# Patient Record
Sex: MALE | Race: BLACK OR AFRICAN AMERICAN
[De-identification: names, ages, dates, MRNs, and addresses within clinical notes are randomized per-mention and may not be internally consistent; named-entity substitution may affect disease eponyms.]

---

## 2019-03-01 ENCOUNTER — HOSPITAL ENCOUNTER (EMERGENCY)
Dept: HOSPITAL 65 - ER | Age: 54
LOS: 1 days | Discharge: OTHER INSTITUTION | End: 2019-03-02
Payer: MEDICARE

## 2019-03-01 VITALS
SYSTOLIC BLOOD PRESSURE: 117 MMHG | WEIGHT: 280 LBS | BODY MASS INDEX: 34.82 KG/M2 | DIASTOLIC BLOOD PRESSURE: 71 MMHG | HEIGHT: 75 IN

## 2019-03-01 DIAGNOSIS — R22.41: ICD-10-CM

## 2019-03-01 DIAGNOSIS — M86.9: Primary | ICD-10-CM

## 2019-03-01 DIAGNOSIS — E87.6: ICD-10-CM

## 2019-03-01 DIAGNOSIS — Z79.899: ICD-10-CM

## 2019-03-01 PROCEDURE — 81000 URINALYSIS NONAUTO W/SCOPE: CPT

## 2019-03-01 PROCEDURE — 80053 COMPREHEN METABOLIC PANEL: CPT

## 2019-03-01 PROCEDURE — 36415 COLL VENOUS BLD VENIPUNCTURE: CPT

## 2019-03-01 PROCEDURE — 93005 ELECTROCARDIOGRAM TRACING: CPT

## 2019-03-01 PROCEDURE — 82550 ASSAY OF CK (CPK): CPT

## 2019-03-01 PROCEDURE — 99285 EMERGENCY DEPT VISIT HI MDM: CPT

## 2019-03-01 PROCEDURE — 71045 X-RAY EXAM CHEST 1 VIEW: CPT

## 2019-03-01 PROCEDURE — 82803 BLOOD GASES ANY COMBINATION: CPT

## 2019-03-01 PROCEDURE — 96365 THER/PROPH/DIAG IV INF INIT: CPT

## 2019-03-01 PROCEDURE — 96375 TX/PRO/DX INJ NEW DRUG ADDON: CPT

## 2019-03-01 PROCEDURE — 36600 WITHDRAWAL OF ARTERIAL BLOOD: CPT

## 2019-03-01 PROCEDURE — 87040 BLOOD CULTURE FOR BACTERIA: CPT

## 2019-03-01 PROCEDURE — 85025 COMPLETE CBC W/AUTO DIFF WBC: CPT

## 2019-03-01 PROCEDURE — 83880 ASSAY OF NATRIURETIC PEPTIDE: CPT

## 2019-03-01 PROCEDURE — 73706 CT ANGIO LWR EXTR W/O&W/DYE: CPT

## 2019-03-01 PROCEDURE — 87086 URINE CULTURE/COLONY COUNT: CPT

## 2019-03-01 NOTE — PCM.EKG
[facility]

                                       

Test Date:    2019               Test Time:    23:52:10

Pat Name:     LEDA PATEL             Department:   

Patient ID:   [id number]          Room:          

Gender:       M                        Technician:   

:          1965               Requested By: KELSEY MARTINES

Order Number: [id number]           Reading MD:   Kelsey Martines

                                 Measurements

Intervals                              Axis          

Rate:         117                      P:            71

FL:           124                      QRS:          34

QRSD:         88                       T:            24

QT:           332                                    

QTc:          463                                    

                           Interpretive Statements

Sinus tachycardia

Nonspecific ST and T wave abnormality

Abnormal ECG

No previous ECG available for comparison



Electronically Signed On 3-2-2019 22:00:38 CST by Kelsey Martines



Please click the below link to view image of tracing.

## 2019-03-01 NOTE — ER.PDOC
General


Chief Complaint:  Requesting Medical Care


Stated Complaint:  Edema


Time seen by MD:  23:29


Source:  patient


Exam Limitations:  no limitations





History of Present Illness


Initial Comments


Pt's sister called EMS due to inability to get pt out of chair and into her 

car. Had appt tomorrow with a doctor in [place], but was unable to get him 

there. Pt can not bend R leg fully or bear weight on it. Leg has been swollen 

for more than 1 year, and has not sought treatment for it.


Onset:  other (more than 1 year ago)


Severity:  severe


Exacerbated By:  nothing


Relieved By:  nothing


Prior symptoms/Treatment:  No Similar symptoms previous, No Recenly Seen, No 

Treated by Doctor, No Recently Hospitalized


Allergies:  


Coded Allergies:  


     No Known Allergies (Unverified , 8/25/13)





Review of Systems


Constitutional:  no symptoms reported


EENTM:  no symptoms reported


Respiratory:  no symptoms reported


Cardiovascular:  no symptoms reported


Gastrointestinal:  no symptoms reported


Genitourinary:  no symptoms reported


Skin:  dryness


Psychiatric/Neurological:  other (decreased sensation to R lower leg (worse in 

the foot) for almost 1 year)


All Other Systems:  Reviewed and Negative





Physical Exam


General Appearance:  Alert


Lower Extremity:  tenderness (over fluctuant regions near R knee), swelling (

severe, entire R leg), pedal edema (3+ on R)


Vascular:  decreased pulses (difficu)


Neuro/Psych:  sensory deficit (decreased sensation R lower leg, from about mid 

lower leg to foot, worse on sole of foot (near absent))


Skin:  color nml


Back/Neck:  nml inspection


Respiratory:  no resp distress


CVS:  tachycardia (reg rhythm)


Abdomen:  non-tender


Comments


Pt has chronic skin changes/thickening of R lower leg up to around knee. Unable 

to straighten leg completely, kept flexed at approx 15-20 degrees.





Results/Orders


Results/Orders





Laboratory Tests








Test


 3/2/19


00:00 3/2/19


00:31


 


White Blood Count


 7.2 10^3/uL


(4.5-11.0) 





 


Red Blood Count


 4.08 10^6/uL


(4.50-5.90) 





 


Hemoglobin


 10.9 g/dL


(13.9-16.3) 





 


Hematocrit


 32.9 %


(37.0-53.0) 





 


Mean Corpuscular Volume


 80.6 fL


() 





 


Mean Corpuscular Hemoglobin


 26.7 pg


(26-34) 





 


Mean Corpuscular Hemoglobin


Concent 33.1 g/dL


(33-37) 





 


Red Cell Distribution Width


 17.7 %


(11.5-14.5) 





 


Platelet Count


 243 10^3/uL


(150-400) 





 


Mean Platelet Volume


 11.1 fL


(7.8-11.0) 





 


Neutrophils (%) (Auto)


 70.0 %


(41.0-85.0) 





 


Lymphocytes (%) (Auto)


 13.6 %


(24.0-44.0) 





 


Monocytes (%) (Auto)


 13.7 %


(5.0-12.0) 





 


Neutrophils # (Auto)


 5.0 10^3/uL


(1.8-7.7) 





 


Lymphocytes # (Auto)


 1.0 10^3/uL


(1.0-4.8) 





 


Monocytes # (Auto)


 1.0 10^3/uL


(0.3-0.8) 





 


Absolute Immature Granulocyte


(auto 0.01 10^3 u/L


(0-2) 





 


Eosinophils %


 2.2 %


(0.0-5.0) 





 


Basophils %


 0.4 %


(0.0-0.2) 





 


Basophils #


 0.0 10^3/uL


(0.0-0.1) 





 


Eosinophil Count


 0.2 10^3/uL


(0.0-0.2) 





 


Blood Gas Sample Site


 RT RADIAL


ARTERY 





 


Blood Gas pH


 7.379


(7.350-7.450) 





 


Blood Gas PCO2


 18.8 mmHg


(35.0-45.0) 





 


Blood Gas PO2


 103.9 mmHg


(75.0-100.0) 





 


Blood Gas HCO3


 10.8 mmol/L


(22.0-26.0) 





 


Blood Gas Base Excess


 -12.1 mmol/L


(-2.0-2.0) 





 


Audi Test POSITIVE  


 


Arterial Blood Oxygen


Saturation 96.4 % (95-) 


 





 


Deoxyhemoglobin


 3.5 %


(0.2-0.6) 





 


Carboxyhemoglobin


 2.4 %


(0.5-1.5) 





 


Methemoglobin


 0.2 %


(0.2-0.6) 





 


Total Hemoglobin


 11.5 %


(13.5-17.5) 





 


Total Oxygen Concentration


 15.3 %


(13.5-17.5) 





 


Lactic Acid (Blood Gas)


 11.4 MMOL/L


(0.5-1.0) 





 


Oxygen Delivery Method (LAB) RA  


 


FiO2 21 % ()  


 


Sodium Level


 140 mmol/L


(132-145) 





 


Potassium Level


 2.5 mmol/L


(3.6-5.2) 





 


Chloride Level


 97.0 mmol/L


() 





 


Carbon Dioxide Level


 10.6 mmol/L


(20.0-32) 





 


Bicarbonate


 11.4 mmol/L


(23-27) 





 


Anion Gap 34.9  


 


Blood Urea Nitrogen 5 mg/dL (7-18)  


 


Creatinine


 0.68 mg/dL


(0.59-1.40) 





 


Estimated GFR (


American) 147.0 (>/=60) 


 





 


BUN/Creatinine Ratio 7.0  


 


Glucose Level


 54 mg/dL


() 





 


Calcium Level


 8.4 mg/dL


(8.4-10.5) 





 


Total Bilirubin


 0.6 mg/dL


(0.2-1.0) 





 


Aspartate Amino Transf


(AST/SGOT) 70 U/L (0-35) 


 





 


Alanine Aminotransferase


(ALT/SGPT) 17 U/L (12-78) 


 





 


Alkaline Phosphatase


 62 U/L


() 





 


Total Creatine Kinase


 89 U/L


() 





 


Pro-B-Type Natriuretic Peptide


 17 pg/mL


(0-125) 





 


Total Protein


 6.6 g/dL


(6.4-8.2) 





 


Albumin


 2.5 g/dL


(3.4-5.0) 





 


Globulin 4.1  


 


Percent Immature Gran (Cell


Imm) 0.10 %


(0.00-0.50) 





 


Urine Collection Type  CCMS 


 


Urine Color


 


 YELLOW


(YELLOW)


 


Urine Appearance  CLEAR (CLEAR) 


 


Urine Bilirubin


 


 NEGATIVE MG/DL


(NEGATIVE)


 


Urine Ketones


 


 150 mg/dL


(NEGATIVE)


 


Urine Specific Gravity


 


 1.015


(1.005-1.035)


 


Urine pH  6 (5.0-6.0) 


 


Urine Protein


 


 NEGATIVE


(NEGATIVE)


 


Urine Urobilinogen


 


 NORMAL


(NEGATIVE)


 


Urine Nitrate


 


 NEGATIVE


(NEGATAIVE)


 


Urine Leukocyte Esterase


 


 NEGATIVE


(NEGATIVE)


 


Urine Blood


 


 10 TR


(NEGATIVE)


 


Urine RBC


 


 NONE SEEN


RBC/HPF (NONE


 


Urine WBC


 


 2-5 WBC/HPF


(0-2)


 


Urine Squamous Epithelial


Cells 


 FEW #/HPF


(FEW)


 


Urine Bacteria


 


 RARE (NONE


SEEN)


 


Urine Other  MUCUS 1+ #/HPF 


 


Urine Glucose


 


 NORMAL


(NEGATIVE)








Administered Medications








 Medications


  (Trade)  Dose


 Ordered  Sig/Muriel


 Route


 PRN Reason  Start Time


 Stop Time Status Last Admin


Dose Admin


 


 Vancomycin HCl


 1.9 gm/Sodium


 Chloride  300 ml @ 


 175 mls/hr  OT  ONCE


 IV


   3/2/19 03:00


 3/2/19 04:42  3/2/19 03:23





 


 Morphine Sulfate


  (Morphine


 Sulfate)  4 mg  STAT  STAT


 IV


   3/2/19 03:11


 3/2/19 03:14 DC 3/2/19 03:24





 


 Potassium Chloride


  (Klor-Con 10)  40 meq  STAT  STAT


 PO


   3/2/19 03:55


 3/2/19 03:56 DC 3/2/19 03:58














Progress


Progress


I have a high suspicion for infection of the knee, likely chronic osteomyelitis

, with an elevated lactate and changes seen on CT. I discussed the CT findings 

with the radiologist as well. Will transfer to [facility] per pt and family request.





EKG/XRAY/CT/US


EKG:  rhythm (sinus tachy), MO (nml), QRS (nml), nonspecific ST T wave chg


EKG Comments:  no acute ischemic changes seen





Departure


Time of Disposition:  02:39


Disposition:  70 DISC/XFER TO [facility]


Impression:  


 Primary Impression:  


 Osteomyelitis of right knee region


 Additional Impressions:  


 Mass of right lower leg


 Hypokalemia


Condition:  Stable


Referrals:  


RYAN BRADLEY DO (PCP)


PRIMARY CARE PROVIDER


Duration or Time Spent with Pa:  45





Problem Qualifiers











KELSEY MARTINES DO Mar 1, 2019 23:49

## 2019-03-02 VITALS — DIASTOLIC BLOOD PRESSURE: 65 MMHG | SYSTOLIC BLOOD PRESSURE: 131 MMHG

## 2019-03-02 VITALS — SYSTOLIC BLOOD PRESSURE: 131 MMHG | DIASTOLIC BLOOD PRESSURE: 65 MMHG

## 2019-03-02 LAB
ALP INTEST CFR SERPL: 62 U/L (ref 50–136)
ALT SERPL-CCNC: 17 U/L (ref 12–78)
APPEARANCE UR: CLEAR
AST SERPL-CCNC: 70 U/L (ref 0–35)
BASE EXCESS BLDA CALC-SCNC: -12.1 MMOL/L (ref -2–2)
BASOPHILS # BLD AUTO: 0 10^3/UL (ref 0–0.1)
BASOPHILS NFR BLD AUTO: 0.4 % (ref 0–0.2)
BILIRUB UR STRIP.AUTO-MCNC: NEGATIVE MG/DL
CALCIUM SERPL-MCNC: 8.4 MG/DL (ref 8.4–10.5)
CO2 BLDA-SCNC: 10.6 MMOL/L (ref 20–32)
COLOR UR: YELLOW
EOSINOPHIL # BLD AUTO: 0.2 10^3/UL (ref 0–0.2)
EOSINOPHIL NFR BLD AUTO: 2.2 % (ref 0–5)
ERYTHROCYTE [DISTWIDTH] IN BLOOD BY AUTOMATED COUNT: 17.7 % (ref 11.5–14.5)
GLUCOSE PRE 100 G GLC PO SERPL-MCNC: 54 MG/DL (ref 70–110)
HCO3 BLDA-SCNC: 10.8 MMOL/L (ref 22–26)
HGB BLD-MCNC: 10.9 G/DL (ref 13.9–16.3)
LYMPHOCYTES # BLD AUTO: 1 10^3/UL (ref 1–4.8)
LYMPHOCYTES NFR BLD AUTO: 13.6 % (ref 24–44)
MCH RBC QN AUTO: 26.7 PG (ref 26–34)
MCHC RBC AUTO-ENTMCNC: 33.1 G/DL (ref 33–37)
MCV RBC AUTO: 80.6 FL (ref 78–100)
MONOCYTES # BLD AUTO: 1 10^3/UL (ref 0.3–0.8)
MONOCYTES NFR BLD AUTO: 13.7 % (ref 5–12)
NEUTROPHILS # BLD AUTO: 5 10^3/UL (ref 1.8–7.7)
NEUTROPHILS NFR BLD AUTO: 70 % (ref 41–85)
PCO2 BLDA: 18.8 MMHG (ref 35–45)
PH BLDA: 7.38 [PH] (ref 7.35–7.45)
PLATELET # BLD AUTO: 243 10^3/UL (ref 150–400)
PMV BLD AUTO: 11.1 FL (ref 7.8–11)
UROBILINOGEN UR QL STRIP.AUTO: NORMAL
WBC # BLD AUTO: 7.2 10^3/UL (ref 4.5–11)

## 2019-03-02 NOTE — DIREP
PROCEDURE:CHEST 1 VIEW

 

COMPARISON:None.

 

INDICATIONS:leg swelling

 

FINDINGS:

LUNGS/PLEURA:No significant pulmonary parenchymal abnormalities. No effusions.

VASCULATURE:Normal.  Unremarkable pulmonary vasculature.

CARDIAC:Normal.  No cardiac silhouette abnormality or cardiomegaly. 

MEDIASTINUM:Normal.  No visible mass or adenopathy. 

BONES:Normal.  No fracture or visible bony lesion. 

OTHER:Negative.  

 

CONCLUSION:No acute cardiopulmonary process.  

 

 

 

Dictated by: Ash Ramachandran MD on 03/02/2019 at 01:01 AM     

Electronically Signed By: Ash Ramachandran MD on 03/02/2019 at 01:01 AM

## 2019-03-02 NOTE — DIREP
PROCEDURE:CTA LOWER EXTREMITY BILAT

 

COMPARISON:None.

 

INDICATIONS:leg swelling mostly on right pt could not straigten leg

 

TECHNIQUE:After obtaining the patient's consent, CT images were obtained 

without and with non-ionic intravenous contrast material.  Multi-planar MIPs 

and 3-D images were created to optimize visualization of vascular anatomy.  

 

FINDINGS:

RIGHT LOWER EXTREMITY

COMMON FEMORAL:Patent

SUPERFICIAL FEMORAL:Patent

POPLITEAL:Patent but diminutive

POSTERIOR TIBIAL:Patent but diminutive

ANTERIOR TIBIAL:Patent but diminutive

PERONEAL:Patent but diminutive

 

LEFT LOWER EXTREMITY

COMMON FEMORAL:Patent

SUPERFICIAL FEMORAL:Patent

POPLITEAL:Patent

POSTERIOR TIBIAL:Patent

ANTERIOR TIBIAL:Patent

PERONEAL:Patent

 

OTHER:Extensive soft tissue swelling and presumed large hematoma with varying 

density suggesting acute on chronic hematoma is seen throughout the right lower 

extremity starting at about the knee.  Destructive changes are seen in the knee 

which may suggest osteomyelitis or septic joint in the appropriate clinical 

setting.  There also may be a comminuted fracture through the proximal tibia 

and distal femur, limited evaluation on this CTA, either a right knee 

radiograph or MRI of the knee may be beneficial.  Internal fixation of the 

distal right tibia femur is seen.  There is extensive large bulky 

lymphadenopathy in the right lower pelvis and right groin.

 

Artifact of poor filling versus partial thrombus is seen in the left femoral 

vein.  Areas of poor enhancement in the kidneys possibly artifact of motion 

however pyelonephritis cannot be excluded.

 

 

CONCLUSION:

1.  Extensive soft tissue swelling and edema of the right lower extremity 

starting at the knee with fluid collection behind the knee, possibly acute on 

chronic large hematoma.  Much of the right lower extremity musculature below 

the knee is replaced by soft infiltrative tissue density.  Large complex 

atypical infectious process is also on the differential.  While unlikely soft 

tissue sarcoma or neoplasm is also possible.  The swelling and soft tissue 

changes or significant of that compartment syndrome would be a concern.

2.  Destructive bony changes in the right knee suggesting septic joint and 

osteomyelitis.  Possible fractures in the distal femur on the right and 

proximal tibia as well, difficult to visualize on this CTA.  Follow-up 

radiograph of the right knee or MRI is recommended.  

 

3.  Extensive lymphadenopathy in the right groin and pelvis probably reactive 

however metastatic disease cannot be excluded and follow-up after resolution of 

right lower extremity changes recommended.

4.  Areas of poor enhancement in the kidneys possibly artifact of motion 

however pyelonephritis cannot be excluded.

5.  Questionable left femoral vein partial thrombosis versus artifact of poor 

filling.

This report was called by telephone at 2:30 am on March 2, 2019 to [facility]             .

 

 

Dictated by: Ash Ramachandran MD on 03/02/2019 at 02:09 AM     

Electronically Signed By: Ash Ramachandrna MD on 03/02/2019 at 02:30 AM